# Patient Record
(demographics unavailable — no encounter records)

---

## 2025-01-22 NOTE — DISCUSSION/SUMMARY
[de-identified] : The patient was advised of the diagnosis. The natural history of the pathology was explained in full to the patient  in layman's terms.  knee injury- left- concerning for intrinsic compromise Right knee with complex lateral meniscus tear since 2023 Here is the plan that we have set forth today. 1. MRI left knee 2. likely referral to ortho surgeon after MRis completed 3. hold sports and lifting 4. ice compression and modify activity as tolerated. The patient understands the plan of care as described above.  All questions have been answered. Thank you for allowing me to care for JAC . Sincerely, Eliane Santillan, DO, FAAP, CAQ-[facility]

## 2025-01-22 NOTE — DATA REVIEWED
[FreeTextEntry1] : 4 view left knee mild spurring and old SLJ finding on the lateral- not likely related to current complaints. tug lesion- posterior femur otherwise no acute fracture seen

## 2025-01-22 NOTE — IMAGING
[de-identified] : Knee ROM                                      RIGHT EXTENSION: Neutral  FLEXION: 140                             LEFT               EXTENSION: end range neutral pain- + pain with bounce test +grimace and guard with flexion FLEXION: 120   Exam of the   LEFT Knee: Ecchymosis: NONE Soft Tissues No redness, or localized warmth Effusion: mild effusion Tenderness: Tender to palpation at lateral joint line  into posterolateral corner.  mild discomfort to the distal hamstring tendon and at lateral tibial plateau Special tests: Mary's:Positive Pain with squatting/Duck walk ([place] Test):Not Examined Jeana Test: Not Examined   Knee stability:  Varus: No Laxity Valgus: No Laxity,  Lachman and/or Anterior Drawer: ?subtle laxity compared to contralateral side        Posterior Drawer: NEGATIVE Posterolateral corner testing: Not Examined   Patella: Crepitus: none,   Patellar apprehension: NEGATIVE Patellar grind: Negative   Patella: Mobility RIGHT QUADRANTS MEDIAL: 1-2 QUADRANTS LATERAL: 1-2  LEFT               QUADRANTS MEDIAL: 1-2  QUADRANTS LATERAL: 1-2   Strength: Atrophy: Quadriceps tone symmetric Patient able to perform a straight leg raise: Yes :No evidence of Extensor Lag sensation intact throughout negative homans

## 2025-01-22 NOTE — HISTORY OF PRESENT ILLNESS
[de-identified] : 01/22/2025: JAC GOINS is a 27-year-old M here today in regard to B/L knee pain.  Right is pretty pain free at this point- he had a meniscal surgery 10 years ago by Dr. Veliz at [facility] then he injured knee summer 2023- had MRI- complex lateral meniscus tear- lost insurance Only now does he have insurance again- and recently acutely injured the left knee playing basketball Feel just like his prior meniscal injuries. +swelling + restricted ROM secondary to pain   Original right sided injury: tore his meniscus on the RT knee, from 10 years ago from playing HS basketball. Had sx on 2014-15.  Now, he states he was playing basketball a few months ago, when he injured it by jumping for a rebound, and came back down and heard a pop in his LT knee. It had swollen up at the time, but denies seeking prior treatment due to ins. He reports an Orthopedic had diagnosed pt with tenonitis in both knees, about over a year ago.  Has MRI from [facility] of the RT knee 8/2023   left is painful with squating- deferred all lower body lifting in the gym     Review of Systems: Constitutional: negative HEENT: negative CV: negative Pulm: negative GI: negative : negative Neuro: negative Skin: negative Endocrine: negative Heme: negative MSK: See HPI.

## 2025-02-03 NOTE — HISTORY OF PRESENT ILLNESS
[de-identified] : The patient is a 27 year old R hand dominant male who presents today complaining of LT knee pain. Date of Injury/Onset: 11/30/24 Pain:    At Rest: 0/10             With Activity:  8/10 Mechanism of injury: jumped up to grab a rebound playing basketball and when he landed he felt a pop This is NOT a Work Related Injury being treated under Worker's Compensation. This is NOT an athletic injury occurring associated with an interscholastic or organized sports team. Quality of symptoms: sharp pain with activities  Improves with: rest Worse with: pivoting. twisting, knee flexion > 90 degrees Prior Treatment: Dr. Santillan Prior Imaging: MRI [facility] 1/29/25 Out of work/sport: currently working School/Sport/Position/Occupation:  Additional Information: R knee Meniscus repair in ~2015

## 2025-02-03 NOTE — IMAGING
[de-identified] : The patient is a well appearing 27 year old male of their stated age. Patient ambulates with a normal gait. Negative straight leg raise bilateral   Effected Knee: LEFT ROM:  0-130 degrees Lachman: Negative Pivot Shift: Negative Anterior Drawer: Negative Posterior Drawer / Sag: Negative Varus Stress 0 degrees: Stable Varus Stress 30 degrees: Stable Valgus Stress 0 degrees: Stable Valgus Stress 30 degrees: Stable Medial Mary: Negative Lateral Mary: POSITIVE  Patella Glide: 2+ Patella Apprehension: Negative Patella Grind: Negative   Palpation: Medial Joint Line: Nontender Lateral Joint Line: TENDER  Medial Collateral Ligament: Nontender Lateral Collateral Ligament/PLC: Nontender Distal Femur: Nontender Proximal Tibia: Nontender Tibial Tubercle: Nontender Distal Pole Patella: Nontender Quadriceps Tendon: Nontender &  Intact Patella Tendon: Nontender &  Intact Medial Femoral Condyle: Nontender Medial Distal Hamstring/PES: Nontender Lateral Distal Hamstring: Nontender & Stable Iliotibial Band: Nontender Medial Patellofemoral Ligament: Nontender Adductor: Nontender Proximal GSC-Plantaris: Nontender Calf: Supple & Nontender   Inspection: Deformity: No Erythema: No Ecchymosis: No Abrasions: No Effusion: No Prepatella Bursitis: No Neurologic Exam: Sensation L4-S1: Grossly Intact Motor Exam: Quadriceps: 5 out of 5 Hamstrings: 5 out of 5 EHL: 5 out of 5 FHL: 5 out of 5 TA: 5 out of 5 GS: 5 out of 5 Circulatory/Pulses: Dorsalis Pedis: 2+ Posterior Tibialis: 2+ Additional Pertinent Findings: None     Contralateral Knee:                       ROM: 0-145 degrees Other Pertinent Findings: None   Assessment: The patient is a 27 year old male with left knee pain and radiographic and physical exam findings consistent with LMT The patients condition is acute Documents/Results Reviewed Today: MRI left knee Tests/Studies Independently Interpreted Today: MRI left knee reveals evidence of radial lateral meniscus tear Pertinent findings include: 0-130, LJLT, + Lateral [facility], mild effusion Confounding medical conditions/concerns: None     Plan: Discussed operative vs non-operative treatment options including left knee arthroscopic lateral meniscus repair vs meniscectomy and any indicated procedures. Recommended the patient proceed with the suggested surgical procedure to improve the functionality of the affected joint. All questions and concerns regarding the surgery were addressed. Went over the recovery timeline and expected outcomes following surgery. The patient continues to be symptomatic and has failed conservative treatment, deciding to move forward with surgical intervention. Tests Ordered: pre-op  Prescription Medications Ordered: Discussed appropriate use of OTC anti-inflammatories and analgesics (including but not limited to Aleve, Advil, Tylenol, Motrin, Ibuprofen, Voltaren gel, etc.) Braces/DME Ordered: Reparel sleeve  Activity/Work/Sports Status: avoid deep squatting Additional Instructions: None Follow-Up: 2 weeks post op   The patient's current medication management of their orthopedic diagnosis was reviewed today: The patient declined and/or was contraindicated for the recommended prescription medication Naprosyn and will use over the counter Advil, Alleve, Voltaren Gel or Tylenol as directed.  (1) We discussed a comprehensive treatment plan that included possible pharmaceutical management involving the use of prescription strength medications versus over the counter oral medications and topical prescription vs over the counter medications.  Based on our extensive discussion, the patient declined prescription medication and will use over the counter Advil, Aleve, Voltaren Gel or Tylenol as directed. (2) There is a moderate risk of morbidity with further treatment, especially from use of prescription strength medications and possible side effects of these medications which include upset stomach with oral medications, skin reactions to topical medications and cardiac/renal issues with long term use. (3) I recommended that the patient follow-up with their medical physician to discuss any significant specific potential issues with long term medication use such as interactions with current medications or with exacerbation of underlying medical comorbidities. (4) The benefits and risks associated with use of injectable, oral or topical, prescription and over the counter anti-inflammatory medications were discussed with the patient. The patient voiced understanding of the risks including but not limited to bleeding, stroke, kidney dysfunction, heart disease, and were referred to the black box warning label for further information.  Consent:  Conservative treatment, nontreatment, nonsurgical intervention and surgical intervention treatment options have been reviewed with the patient.  The patient continues to be symptomatic and has failed conservative treatment, and elects to move forward with surgical intervention.  The patient is indicated for left knee arthroscopic lateral meniscus repair vs meniscectomy and all indicated procedures. As such the alternatives, benefits and risks, of the above procedure, including but not limited to bleeding, infection, neurovascular injury, loss of limb, loss of life,  DVT, PE, RSD, inability to return to previous level of activity, inability to return to previous level of employment, advancement of or to osteoarthritic changes, joint instability or motion loss, hardware failure or migration, meniscus repair failure, failure to resolve all symptoms, failure to return to sports and need for further procedures, as well as specific risk of need for future joint arthroplasty were discussed with the patient and/or their legal guardian who agreed to move forward with surgical intervention.  They have reviewed and signed the consent form today after expressing understanding of the above documented conversation. The patient or their representative will contact my office as instructed on the preoperative instruction sheet they received today to schedule surgery in a timely manner as discussed. Over 25 minutes were spent on this encounter including time with the patient and over 15 minutes spent on counseling and coordination of care.   IEliane attest that this documentation has been prepared under the direction and in the presence of Provider Dr. Kingsley Fitzpatrick.  The documentation recorded by the scribe accurately reflects the services IDr. Kingsley, personally performed and the decisions made by me.

## 2025-02-03 NOTE — DATA REVIEWED
[MRI] : MRI [Left] : left [Knee] : knee [Report was reviewed and noted in the chart] : The report was reviewed and noted in the chart [I independently reviewed and interpreted images and report] : I independently reviewed and interpreted images and report [I reviewed the films/CD and additionally noted] : I reviewed the films/CD and additionally noted [FreeTextEntry1] : MRI left knee reveals evidence of radial lateral meniscus tear